# Patient Record
Sex: MALE | Race: WHITE | URBAN - METROPOLITAN AREA
[De-identification: names, ages, dates, MRNs, and addresses within clinical notes are randomized per-mention and may not be internally consistent; named-entity substitution may affect disease eponyms.]

---

## 2020-11-16 ENCOUNTER — OFFICE VISIT (OUTPATIENT)
Dept: CARDIOLOGY CLINIC | Age: 45
End: 2020-11-16
Payer: COMMERCIAL

## 2020-11-16 VITALS
DIASTOLIC BLOOD PRESSURE: 76 MMHG | SYSTOLIC BLOOD PRESSURE: 108 MMHG | HEIGHT: 70 IN | OXYGEN SATURATION: 99 % | HEART RATE: 68 BPM | BODY MASS INDEX: 25.4 KG/M2 | WEIGHT: 177.4 LBS | TEMPERATURE: 97.7 F

## 2020-11-16 PROCEDURE — 99203 OFFICE O/P NEW LOW 30 MIN: CPT | Performed by: INTERNAL MEDICINE

## 2020-11-16 PROCEDURE — 93000 ELECTROCARDIOGRAM COMPLETE: CPT | Performed by: INTERNAL MEDICINE

## 2020-11-16 NOTE — PATIENT INSTRUCTIONS

## 2020-11-16 NOTE — PROGRESS NOTES
[address]      Cardiology Consult    Benjamin peguero Cozmanciuc  1975 November 16, 2020    Referring Physician: Dr. Benjamin Mas  Reason for Referral: Palpitations     CC: \"palpitations. \"     HPI:  The patient is 39 y.o. male with no known cardiac history who presents today for evaluation of palpitations. He endorses 3-4 episodes of palpitations in late May/early June that he describes as his \"heart racing. \" He states his heart rate was ~150 noted on his apple watch. The first event occurred at night and lasted 2-3 hours. He states the sensation resolved without any particular intervention and denies any significant episodes since June. In general, he states he is doing well and overall healthy. He denies any associated shortness of breath, lightheadedness, or chest pains. Patient denies exertional chest pain/pressure, dyspnea at rest, PALOMARES, PND, orthopnea, lightheadedness, weight changes, changes in LE edema, and syncope. History reviewed. No pertinent past medical history. History reviewed. No pertinent surgical history. History reviewed. No pertinent family history. Social History     Tobacco Use    Smoking status: Never Smoker    Smokeless tobacco: Never Used   Substance Use Topics    Alcohol use: Not on file    Drug use: Not on file       No Known Allergies  No current outpatient medications on file. No current facility-administered medications for this visit. Review of Systems:  · Constitutional: No unanticipated weight loss. There's been no change in energy level, sleep pattern, or activity level. No fevers, chills. · Eyes: No visual changes or diplopia. No scleral icterus. · ENT: No Headaches, hearing loss or vertigo. No mouth sores or sore throat. · Cardiovascular: as reviewed in HPI  · Respiratory: No cough or wheezing, no sputum production. No hemoptysis. · Gastrointestinal: No abdominal pain, appetite loss, blood in stools.  No change in bowel or bladder for: BUN, CREATININE    EKG Interpretation: 11/16/20 Sinus rhythm. Image Review:   No stress or echo on file     Assessment/Plan:   1) Palpitations. Discussed wearing an event monitor or continued observation but with episodes being infrequent an arrhythmia may be difficult to capture. If there was a recurrence of a sustained episode, he could obtain an EKG at our office or urgent care. At this point, he is comfortable with continued observation or call if they episodes become more frequent will arrange for the monitor. Follow up PRN    Thank you very much for allowing me to participate in the care of your patient. Please do not hesitate to contact me if you have any questions. Sincerely,  Rosaura Bledsoe. Manoj Lehman, [address] [place] [address], [address] [place] [address]  Ph: [phone]  Fax: [phone]    This note was scribed in the presence of Dr Manoj Lehman MD by Vance Alvarenga RN. Physician Attestation: The scribes documentation has been prepared under my direction and personally reviewed by me in its entirety. I confirm that the note above accurately reflects all work, treatment, procedures, and medical decision making performed by me. All portions of the note including but not limited to the chief complaint, history of present illness, physical exam, assessment and plan/medical decision making were personally reviewed, edited, and updated on the day of the visit.